# Patient Record
Sex: FEMALE | Race: WHITE | NOT HISPANIC OR LATINO | Employment: OTHER | URBAN - METROPOLITAN AREA
[De-identification: names, ages, dates, MRNs, and addresses within clinical notes are randomized per-mention and may not be internally consistent; named-entity substitution may affect disease eponyms.]

---

## 2021-01-18 ENCOUNTER — HISTORICAL (OUTPATIENT)
Dept: ADMINISTRATIVE | Facility: HOSPITAL | Age: 51
End: 2021-01-18

## 2021-02-13 LAB
INFLUENZA A ANTIGEN, POC: NEGATIVE
INFLUENZA B ANTIGEN, POC: NEGATIVE

## 2021-03-24 LAB — SARS-COV-2 RNA RESP QL NAA+PROBE: POSITIVE

## 2022-04-10 ENCOUNTER — HISTORICAL (OUTPATIENT)
Dept: ADMINISTRATIVE | Facility: HOSPITAL | Age: 52
End: 2022-04-10

## 2022-04-24 VITALS
WEIGHT: 140 LBS | HEIGHT: 68 IN | OXYGEN SATURATION: 100 % | DIASTOLIC BLOOD PRESSURE: 68 MMHG | SYSTOLIC BLOOD PRESSURE: 131 MMHG | BODY MASS INDEX: 21.22 KG/M2

## 2022-05-03 NOTE — HISTORICAL [facility]
This is a historical note converted from Javier. Formatting and pictures may have been removed.  Please reference Javier for original formatting and attached multimedia. Chief Complaint  Pt here for Rt knee x 8 months. Pt states she initially injured her knee after falling in Nov. 2019. Pt states she has been going to [facility] for 3 months; less painful, but still not able to do normal routines. She reports taping for relief.  History of Present Illness  She is a pleasant 50-year-old whose had right knee pain since October 2019. ?The pains located both medially and laterally around the kneecap. ?She notes it worse with activities such as squatting?or power walking. ?She has difficulty with jumping.? She tried formal physical therapy for 3 months. ?She is using a brace and taping. ?She has had?steroid injection in the past.? She denies any numbness or tingling  Review of Systems  Comprehensive review of system?was performed with no exceptions other than noted in the history of present illness  Physical Exam  Vitals & Measurements  T:?96.8? ?F (Oral)? HR:?68(Peripheral)? BP:?116/72?  HT:?172.00?cm? WT:?63.390?kg? BMI:?21.43?  Gen: WN, WD, NAD  Card/Res: NL breathing, +distal pulses  Abdomen: ND  Standing exam  stance: normal alignment, no significant leg-length discrepancy  gait:?Antalgic limp  ?   Knee examination  - General comments: unremarkable appearance  ?   - Tenderness: Para patella  ?   Knee??????????RIGHT??????????LEFT  Skin: ??????????Intact ??????????Intact  ROM:??????????0-120??????????0-130  Effusion:??????+????????????? Neg  MJL TTP:????? Neg ???????????? Neg  LJL TTP: ?????? Neg ???????????? Neg  Nii:? ?Neg ???????????? Neg  Pat crep:?????? Neg ???????????????Neg  Patella TTPs:?+????????????????Neg  Patella grind: Neg??????????? ?Neg  Lachman: ?????Neg ????????????????????Neg  Pivot shift: ?????Neg ???????????? Neg  Valgus stress: Neg ???????????????Neg  Varus stress: Neg  ???????????????Neg  Posterior drawer: Neg ??????????Neg  ?   N-V ????????????????????intact??????????intact  Hip:?????????????????????????nml?????????? nml  ?   Lower extremity edema:Negative  ?  Assessment/Plan  1.?OA (osteoarthritis) of knee?M17.10  ?We will seek approval for a Synvisc injection. ?I will see her back after approval  ?  We also discussed PRP injection and?cartilage procedure with MPFL?reconstruction  Ordered:  hylan G-F 20, 48 mg, Intra-Articular, Once, first dose 01/18/21 11:00:00 CST, stop date 01/18/21 11:00:00 CST, Future Order  Office/Outpatient Visit Level 3 New [id number] PC, OA (osteoarthritis) of knee  Chondral defect of patella, [facility], 01/18/21 10:06:00 CST  ?  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  Orders:  XR Knee Right 4 or More Views, Routine, 01/18/21 8:58:00 CST, None, Stretcher, Patient Has IV?, Rad Type, Right knee pain, Not Scheduled, 01/18/21 8:58:00 CST   Problem List/Past Medical History  Ongoing  No qualifying data  Historical  No qualifying data  Procedure/Surgical History  Bilateral replacement of hip joints (06.2016)  Arthrocentesis, aspiration and/or injection, major joint or bursa (eg, shoulder, hip, knee, subacromial bursa); without ultrasound guidance (04/28/2016)  Fluoroscopic guidance for needle placement (eg, biopsy, aspiration, injection, localization device) (04/28/2016)  Fluoroscopy of Right Hip using Low Osmolar Contrast (04/28/2016)  Injection Hip (Right) (04/28/2016)  Introduction of Anti-inflammatory into Joints, Percutaneous Approach (04/28/2016)  Arthroscopy, hip, diagnostic with or without synovial biopsy (separate procedure) (2012)  biopsy right hip  (2009)  left thumb tendon repair (2004)  tonsillectomy (1975)  lazy eye repair left   Medications  metformin 500 mg oral tablet, 500 mg= 1 tab(s), Oral, BID  NORG-EE 0.18-0.215-0.25/0.035, 1 tab(s), Oral, Daily  progesterone 1.7% topical cream, TOP, As Directed  testosterone 50 mg/5 g (1%) transdermal gel, 1 EA, TOP, Daily  Allergies  Allergy to eggs?(nasal congestion)  Milk Products?(swelling)  Social History  Abuse/Neglect  No, 01/18/2021  Alcohol  Current, Wine, 1-2 times per week, 03/28/2018  Employment/School  Work/School description: occupational therapy. Highest education level: Post graduate degree(s)., 04/24/2016  Home/Environment  Lives with Spouse., 04/24/2016  Nutrition/Health  Regular, 04/24/2016  Substance Use - Denies Substance Abuse, 04/24/2016  Never, 03/28/2018  Tobacco - Denies Tobacco Use, 04/24/2016  Never (less than 100 in lifetime), N/A, 01/18/2021  Family History  Asthma.: Mother.  Diabetes mellitus type 2: Mother and Father.  Hypertension.: Father.  Hypothyroidism.: Mother.  Health Maintenance  Health Maintenance  ???Pending?(in the next year)  ??? ??Due?  ??? ? ? ?Alcohol Misuse Screening due??01/02/21??and every 1??year(s)  ??? ? ? ?ADL Screening due??01/18/21??and every 1??year(s)  ??? ? ? ?Tetanus Vaccine due??01/18/21??and every 10??year(s)  ??? ??Due In Future?  ??? ? ? ?Obesity Screening not due until??01/01/22??and every 1??year(s)  ???Satisfied?(in the past 1 year)  ??? ??Satisfied?  ??? ? ? ?Blood Pressure Screening on??01/18/21.??Satisfied by Aminata Mata  ??? ? ? ?Body Mass Index Check on??01/18/21.??Satisfied by Aminata aMta  ??? ? ? ?Influenza Vaccine on??01/18/21.??Satisfied by Aminata Mata  ??? ? ? ?Obesity Screening on??01/18/21.??Satisfied by Aminata Mata  ?  Diagnostic Results  Knee radiographs and MRI were reviewed which shows?chondral thinning?with moderate arthrosis of the patellofemoral joint.

## 2022-09-21 ENCOUNTER — HISTORICAL (OUTPATIENT)
Dept: ADMINISTRATIVE | Facility: HOSPITAL | Age: 52
End: 2022-09-21

## 2023-04-06 ENCOUNTER — OFFICE VISIT (OUTPATIENT)
Dept: URGENT CARE | Facility: CLINIC | Age: 53
End: 2023-04-06
Payer: COMMERCIAL

## 2023-04-06 VITALS
HEIGHT: 68 IN | BODY MASS INDEX: 20.92 KG/M2 | SYSTOLIC BLOOD PRESSURE: 120 MMHG | HEART RATE: 71 BPM | WEIGHT: 138 LBS | RESPIRATION RATE: 18 BRPM | OXYGEN SATURATION: 100 % | TEMPERATURE: 100 F | DIASTOLIC BLOOD PRESSURE: 79 MMHG

## 2023-04-06 DIAGNOSIS — R05.9 COUGH, UNSPECIFIED TYPE: ICD-10-CM

## 2023-04-06 DIAGNOSIS — J06.0 ACUTE LARYNGOPHARYNGITIS: Primary | ICD-10-CM

## 2023-04-06 LAB
CTP QC/QA: YES
POC MOLECULAR INFLUENZA A AGN: NEGATIVE
POC MOLECULAR INFLUENZA B AGN: NEGATIVE

## 2023-04-06 PROCEDURE — 99213 PR OFFICE/OUTPT VISIT, EST, LEVL III, 20-29 MIN: ICD-10-PCS | Mod: ,,, | Performed by: FAMILY MEDICINE

## 2023-04-06 PROCEDURE — 87502 INFLUENZA DNA AMP PROBE: CPT | Mod: QW,,, | Performed by: FAMILY MEDICINE

## 2023-04-06 PROCEDURE — 87502 POCT INFLUENZA A/B MOLECULAR: ICD-10-PCS | Mod: QW,,, | Performed by: FAMILY MEDICINE

## 2023-04-06 PROCEDURE — 99213 OFFICE O/P EST LOW 20 MIN: CPT | Mod: ,,, | Performed by: FAMILY MEDICINE

## 2023-04-06 RX ORDER — PREDNISONE 20 MG/1
20 TABLET ORAL 2 TIMES DAILY
Qty: 6 TABLET | Refills: 0 | Status: SHIPPED | OUTPATIENT
Start: 2023-04-06 | End: 2023-04-09

## 2023-04-06 NOTE — PROGRESS NOTES
"Subjective:      Patient ID: Jonatan Kerns is a 52 y.o. female.    Vitals:  height is 5' 8" (1.727 m) and weight is 62.6 kg (138 lb). Her temperature is 99.5 °F (37.5 °C). Her blood pressure is 120/79 and her pulse is 71. Her respiration is 18 and oxygen saturation is 100%.     Chief Complaint: Sinus Problem (Sinus congestion , sinus pressure, cough, hoarseness, chest congestion x 6 days - at home covid test: negative yesterday, exposed to flu )    HPI:  52-year-old female otherwise healthy present to clinic with concerns of nasal congestion, sinus congestion, sinus pressure associated with postnasal drip coughing and hoarseness since 6 days.  No measured fever at home.  Temp in the clinic 99.5.  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  Possible exposure to flu.  COVID-19 test negative at home    ROS :  Constitutional : _ fever , Body aches, Chills  Eyes : _No redness, drainage or pain  HENT_sore throat, postnasal drainage  Respiratory_no wheezing, no shortness of breath  Cardiovascular_no chest pain  Gastrointestinal_ No vomiting, No diarrhea, No abdominal pain  Musculoskeletal_no joint pain, no joint swelling  Integumentary_no skin rash     Objective:     Physical Exam  General : Alert and Oriented, No apparent distress, febrile, sounds hoarse congested and raspy  Neck - supple  HENT : Oropharynx mild redness no swelling no exudate, bilateral TMs intact mild fluid no redness.   Respiratory : Bilateral equal breath sounds, nonlabored respirations  Cardiovascular : Rate, rhythm regular, normal volume pulse, no murmur  Gastrointestinal: Full abdomen, soft, nontender to palpate  Integumentary : Warm, Dry and no rash    Assessment:     1. Acute laryngopharyngitis    2. Cough, unspecified type        Plan:   Discussed laryngitis in detail condition and course.   Warm saltwater gargles   Tylenol and ibuprofen for pain and fever.   Encouraged voice rest.  Cough drops and cold mist vaporizer to keep the airways moist. "   Return to clinic as needed, Call this clinic for any questions   Flu test negative, home COVID-19 test negative per patient    Acute laryngopharyngitis  -     predniSONE (DELTASONE) 20 MG tablet; Take 1 tablet (20 mg total) by mouth 2 (two) times daily. for 3 days  Dispense: 6 tablet; Refill: 0    Cough, unspecified type  -     POCT Influenza A/B MOLECULAR

## 2024-01-02 ENCOUNTER — OFFICE VISIT (OUTPATIENT)
Dept: URGENT CARE | Facility: CLINIC | Age: 54
End: 2024-01-02
Payer: COMMERCIAL

## 2024-01-02 VITALS
HEIGHT: 68 IN | DIASTOLIC BLOOD PRESSURE: 82 MMHG | HEART RATE: 86 BPM | OXYGEN SATURATION: 99 % | RESPIRATION RATE: 16 BRPM | WEIGHT: 140 LBS | SYSTOLIC BLOOD PRESSURE: 136 MMHG | BODY MASS INDEX: 21.22 KG/M2 | TEMPERATURE: 99 F

## 2024-01-02 DIAGNOSIS — R05.9 COUGH, UNSPECIFIED TYPE: ICD-10-CM

## 2024-01-02 DIAGNOSIS — B33.8 RSV INFECTION: Primary | ICD-10-CM

## 2024-01-02 LAB
CTP QC/QA: YES
POC MOLECULAR INFLUENZA A AGN: NEGATIVE
POC MOLECULAR INFLUENZA B AGN: NEGATIVE
RSV RAPID ANTIGEN: POSITIVE
SARS-COV-2 RDRP RESP QL NAA+PROBE: NEGATIVE

## 2024-01-02 PROCEDURE — 87502 INFLUENZA DNA AMP PROBE: CPT | Mod: QW,,, | Performed by: FAMILY MEDICINE

## 2024-01-02 PROCEDURE — 99214 OFFICE O/P EST MOD 30 MIN: CPT | Mod: 25,,, | Performed by: FAMILY MEDICINE

## 2024-01-02 PROCEDURE — 87807 RSV ASSAY W/OPTIC: CPT | Mod: QW,,, | Performed by: FAMILY MEDICINE

## 2024-01-02 PROCEDURE — 96372 THER/PROPH/DIAG INJ SC/IM: CPT | Mod: ,,, | Performed by: FAMILY MEDICINE

## 2024-01-02 PROCEDURE — 87635 SARS-COV-2 COVID-19 AMP PRB: CPT | Mod: QW,,, | Performed by: FAMILY MEDICINE

## 2024-01-02 RX ORDER — CODEINE PHOSPHATE AND GUAIFENESIN 10; 100 MG/5ML; MG/5ML
5 SOLUTION ORAL EVERY 6 HOURS PRN
Qty: 120 ML | Refills: 0 | Status: SHIPPED | OUTPATIENT
Start: 2024-01-02 | End: 2024-01-08

## 2024-01-02 RX ORDER — DEXAMETHASONE SODIUM PHOSPHATE 100 MG/10ML
8 INJECTION INTRAMUSCULAR; INTRAVENOUS
Status: COMPLETED | OUTPATIENT
Start: 2024-01-02 | End: 2024-01-02

## 2024-01-02 RX ORDER — PREDNISONE 10 MG/1
30 TABLET ORAL DAILY
Qty: 12 TABLET | Refills: 0 | Status: SHIPPED | OUTPATIENT
Start: 2024-01-02 | End: 2024-01-06

## 2024-01-02 RX ORDER — AMOXICILLIN AND CLAVULANATE POTASSIUM 875; 125 MG/1; MG/1
1 TABLET, FILM COATED ORAL EVERY 12 HOURS
Qty: 14 TABLET | Refills: 0 | Status: SHIPPED | OUTPATIENT
Start: 2024-01-02 | End: 2024-01-09

## 2024-01-02 RX ORDER — FLUOXETINE HYDROCHLORIDE 20 MG/1
1 CAPSULE ORAL EVERY MORNING
COMMUNITY
Start: 2023-06-09 | End: 2024-06-08

## 2024-01-02 RX ORDER — EPINEPHRINE 0.3 MG/.3ML
INJECTION SUBCUTANEOUS
COMMUNITY
Start: 2023-11-08

## 2024-01-02 RX ADMIN — DEXAMETHASONE SODIUM PHOSPHATE 8 MG: 100 INJECTION INTRAMUSCULAR; INTRAVENOUS at 02:01

## 2024-01-02 NOTE — PROGRESS NOTES
"Subjective:      Patient ID: Jonatan Kerns is a 53 y.o. female.    Vitals:  height is 5' 8" (1.727 m) and weight is 63.5 kg (140 lb). Her temperature is 98.7 °F (37.1 °C). Her blood pressure is 136/82 and her pulse is 86. Her respiration is 16 and oxygen saturation is 99%.     Chief Complaint: Nasal Congestion (Patient presents to the clinic today with complaints of congestion and cough since last Friday. OTC Mucinex taken. )    53-year-old female presents to clinic complaining of a four-day history of congestion and cough.  Felt a little winded on exertion.  Denies any fever vomiting or diarrhea.  Has been doing Mucinex and Claritin.        Constitution: Negative.   HENT:  Positive for postnasal drip and sinus pressure.    Cardiovascular: Negative.    Eyes: Negative.    Respiratory:  Positive for cough.    Gastrointestinal: Negative.    Genitourinary: Negative.    Musculoskeletal: Negative.    Skin: Negative.    Allergic/Immunologic: Negative.    Neurological: Negative.    Hematologic/Lymphatic: Negative.       Objective:     Physical Exam   Constitutional: She is oriented to person, place, and time. She appears well-developed. She is cooperative.  Non-toxic appearance. She does not appear ill. No distress.   HENT:   Head: Normocephalic and atraumatic.   Ears:   Right Ear: Hearing, tympanic membrane and external ear normal.   Left Ear: Hearing, tympanic membrane and external ear normal.   Mouth/Throat: Mucous membranes are normal. Posterior oropharyngeal erythema (postnasal drip) present.   Eyes: Conjunctivae and lids are normal.   Neck: Trachea normal and phonation normal. Neck supple. No edema present. No erythema present. No neck rigidity present.   Cardiovascular: Normal rate.   Pulmonary/Chest: Effort normal and breath sounds normal. No stridor. No respiratory distress. She has no decreased breath sounds. She has no wheezes. She has no rhonchi. She has no rales.   Abdominal: Normal appearance. " "  Lymphadenopathy:     She has no cervical adenopathy.   Neurological: She is alert and oriented to person, place, and time. She exhibits normal muscle tone. Coordination normal.   Skin: Skin is warm, dry, intact, not diaphoretic and no rash.   Psychiatric: Her speech is normal and behavior is normal. Mood, judgment and thought content normal.   Nursing note and vitals reviewed.         Previous History      Review of patient's allergies indicates:   Allergen Reactions    Milk containing products (dairy) Swelling    Egg derived Itching       Past Medical History:   Diagnosis Date    Known health problems: none      Current Outpatient Medications   Medication Instructions    amoxicillin-clavulanate 875-125mg (AUGMENTIN) 875-125 mg per tablet 1 tablet, Oral, Every 12 hours    CMPD testosterone proprionate 2% in vanicream APPLY 4 CLICKS (1 ML) TOPICALLY ONCE DAILY.    EPINEPHrine (EPIPEN) 0.3 mg/0.3 mL AtIn SMARTSI Auto-Injector IM PRN    FLUoxetine 20 MG capsule 1 capsule, Oral, Every morning    guaiFENesin-codeine 100-10 mg/5 ml (TUSSI-ORGANIDIN NR)  mg/5 mL syrup 5 mLs, Oral, Every 6 hours PRN    predniSONE (DELTASONE) 30 mg, Oral, Daily     Past Surgical History:   Procedure Laterality Date    TOTAL HIP ARTHROPLASTY Bilateral      Family History   Problem Relation Age of Onset    No Known Problems Mother     No Known Problems Father     No Known Problems Sister     No Known Problems Brother        Social History     Tobacco Use    Smoking status: Never     Passive exposure: Never    Smokeless tobacco: Never   Substance Use Topics    Alcohol use: Yes    Drug use: Never        Physical Exam      Vital Signs Reviewed   /82   Pulse 86   Temp 98.7 °F (37.1 °C)   Resp 16   Ht 5' 8" (1.727 m)   Wt 63.5 kg (140 lb)   LMP 2023   SpO2 99%   BMI 21.29 kg/m²        Procedures    Procedures     Labs     Results for orders placed or performed in visit on 24   POCT COVID-19 Rapid Screening "   Result Value Ref Range    POC Rapid COVID Negative Negative     Acceptable Yes    POCT respiratory syncytial virus   Result Value Ref Range    RSV Rapid Ag Positive (A) Negative     Acceptable Yes        Assessment:     1. RSV infection    2. Cough, unspecified type        Plan:   RSV positive  Medications sent to pharmacy  Start oral steroids tomorrow morning  Hold the antibiotics for 3-4 days and start if symptoms persist  Start taking an allergy pill daily such as claritin, zyrtec, allegrea or xyzal. Also start using a nasal steroid spray such as flonase or nasacort daily. If you are not being treated for high blood pressure, you can also take decongestant such as sudafed as needed. They can be purchased over the counter. If oral steroids were prescribed, start them tomorrow morning. Monitor for fever. Take tylenol/acetaminophen or ibuprofen as needed. Rest and hydrate. If symptoms persist or worsen, return to clinic or seek medical attention immediately.     RSV infection    Cough, unspecified type  -     POCT COVID-19 Rapid Screening  -     POCT Influenza A/B Molecular  -     POCT respiratory syncytial virus    Other orders  -     dexAMETHasone injection 8 mg  -     predniSONE (DELTASONE) 10 MG tablet; Take 3 tablets (30 mg total) by mouth once daily. for 4 days  Dispense: 12 tablet; Refill: 0  -     amoxicillin-clavulanate 875-125mg (AUGMENTIN) 875-125 mg per tablet; Take 1 tablet by mouth every 12 (twelve) hours. for 7 days  Dispense: 14 tablet; Refill: 0  -     guaiFENesin-codeine 100-10 mg/5 ml (TUSSI-ORGANIDIN NR)  mg/5 mL syrup; Take 5 mLs by mouth every 6 (six) hours as needed for Cough.  Dispense: 120 mL; Refill: 0

## 2024-01-02 NOTE — PATIENT INSTRUCTIONS
Plan:   RSV positive  Medications sent to pharmacy  Start oral steroids tomorrow morning  Hold the antibiotics for 3-4 days and start if symptoms persist  Start taking an allergy pill daily such as claritin, zyrtec, allegrea or xyzal. Also start using a nasal steroid spray such as flonase or nasacort daily. If you are not being treated for high blood pressure, you can also take decongestant such as sudafed as needed. They can be purchased over the counter. If oral steroids were prescribed, start them tomorrow morning. Monitor for fever. Take tylenol/acetaminophen or ibuprofen as needed. Rest and hydrate. If symptoms persist or worsen, return to clinic or seek medical attention immediately.
